# Patient Record
Sex: FEMALE | Race: WHITE
[De-identification: names, ages, dates, MRNs, and addresses within clinical notes are randomized per-mention and may not be internally consistent; named-entity substitution may affect disease eponyms.]

---

## 2017-09-02 ENCOUNTER — HOSPITAL ENCOUNTER (EMERGENCY)
Dept: HOSPITAL 23 - CFTX | Age: 32
Discharge: HOME | End: 2017-09-02
Payer: COMMERCIAL

## 2017-09-02 VITALS — WEIGHT: 293 LBS | HEIGHT: 61 IN | BODY MASS INDEX: 55.32 KG/M2

## 2017-09-02 DIAGNOSIS — Z88.1: ICD-10-CM

## 2017-09-02 DIAGNOSIS — F17.200: ICD-10-CM

## 2017-09-02 DIAGNOSIS — J06.9: ICD-10-CM

## 2017-09-02 DIAGNOSIS — J01.90: Primary | ICD-10-CM

## 2017-09-02 DIAGNOSIS — Z91.040: ICD-10-CM

## 2020-02-21 ENCOUNTER — HOSPITAL ENCOUNTER (EMERGENCY)
Facility: HOSPITAL | Age: 35
Discharge: HOME | End: 2020-02-21
Attending: EMERGENCY MEDICINE
Payer: MEDICAID

## 2020-02-21 VITALS
BODY MASS INDEX: 55.32 KG/M2 | DIASTOLIC BLOOD PRESSURE: 66 MMHG | SYSTOLIC BLOOD PRESSURE: 131 MMHG | RESPIRATION RATE: 18 BRPM | HEIGHT: 61 IN | WEIGHT: 293 LBS | OXYGEN SATURATION: 94 % | TEMPERATURE: 99 F | HEART RATE: 70 BPM

## 2020-02-21 DIAGNOSIS — G89.29 CHRONIC PELVIC PAIN IN FEMALE: Primary | ICD-10-CM

## 2020-02-21 DIAGNOSIS — R10.2 CHRONIC PELVIC PAIN IN FEMALE: Primary | ICD-10-CM

## 2020-02-21 LAB
B-HCG UR QL: NEGATIVE
BILIRUB UR QL STRIP: NEGATIVE
CLARITY UR REFRACT.AUTO: ABNORMAL
COLOR UR AUTO: YELLOW
CTP QC/QA: YES
GLUCOSE UR QL STRIP: NEGATIVE
HGB UR QL STRIP: NEGATIVE
KETONES UR QL STRIP: NEGATIVE
LEUKOCYTE ESTERASE UR QL STRIP: NEGATIVE
NITRITE UR QL STRIP: NEGATIVE
PH UR STRIP: 6 [PH] (ref 5–8)
PROT UR QL STRIP: NEGATIVE
SP GR UR STRIP: 1.02 (ref 1–1.03)
URN SPEC COLLECT METH UR: ABNORMAL

## 2020-02-21 PROCEDURE — 99284 EMERGENCY DEPT VISIT MOD MDM: CPT | Mod: ,,, | Performed by: EMERGENCY MEDICINE

## 2020-02-21 PROCEDURE — 25000003 PHARM REV CODE 250: Performed by: EMERGENCY MEDICINE

## 2020-02-21 PROCEDURE — 99283 EMERGENCY DEPT VISIT LOW MDM: CPT | Mod: 25

## 2020-02-21 PROCEDURE — 81003 URINALYSIS AUTO W/O SCOPE: CPT

## 2020-02-21 PROCEDURE — 99284 PR EMERGENCY DEPT VISIT,LEVEL IV: ICD-10-PCS | Mod: ,,, | Performed by: EMERGENCY MEDICINE

## 2020-02-21 PROCEDURE — 81025 URINE PREGNANCY TEST: CPT | Performed by: EMERGENCY MEDICINE

## 2020-02-21 RX ORDER — DICYCLOMINE HYDROCHLORIDE 10 MG/1
20 CAPSULE ORAL
Status: COMPLETED | OUTPATIENT
Start: 2020-02-21 | End: 2020-02-21

## 2020-02-21 RX ORDER — DICYCLOMINE HYDROCHLORIDE 20 MG/1
20 TABLET ORAL 2 TIMES DAILY PRN
Qty: 25 TABLET | Refills: 0 | Status: SHIPPED | OUTPATIENT
Start: 2020-02-21 | End: 2020-03-22

## 2020-02-21 RX ADMIN — DICYCLOMINE HYDROCHLORIDE 20 MG: 10 CAPSULE ORAL at 11:02

## 2020-02-21 NOTE — ED NOTES
Pt reports pain made worse by lifting, suspects that intercourse last night might have provoked it

## 2020-02-21 NOTE — ED TRIAGE NOTES
Pt reports intense pain deep in groin area for three and a half years, made worse by bowel movements

## 2020-02-21 NOTE — ED NOTES
"Pt report "I guaruntee you I have a UTI right now -- it hurts to pee and it stinks. I finished a round of macrobid but it didn't do anything"  "

## 2020-02-21 NOTE — ED PROVIDER NOTES
Encounter Date: 2/21/2020    SCRIBE #1 NOTE: I, Jill Saavedra, am scribing for, and in the presence of,  Dr. Elliott. I have scribed the following portions of the note - Other sections scribed: HPI ROS PE.       History     Chief Complaint   Patient presents with    Abdominal Pain     onset abd pain that began  this am - deneis n/v. Had  diarrhea 2 days ago and is now constipated     Time patient was seen by the provider: 11:26 AM      The patient is a 34 y.o. female with PMH including: DDD, fibromyalgia, asthma, morbid obesity who presents to the ED with a complaint of reoccurring abdominal pain onset this morning. The patient reports she has been having abdominal pain for 3 years now. She was seen by her OBGYN and PCP for this. She is being seen by a NP here. Patient states pain is mostly on the left lower quadrant of her abdomen. The pain presents when having a bowel movement this morning. Pain waxes and wanes in intensity, lasting for a few minutes of severe pain and then subsides on its own. Rates pain 3/10 at this time. 10/10 when in severe pain. She states she had diarrhea 2 days ago. Today she is constipated. Took 2 pills of Aleve today, no improvement of pain. She had sexual intercourse last night. She also notes she just finished a course of Macrobid for UTI, still complaining of having urinary symptoms. She had a colonoscopy done 4 years ago which was normal. She also reports having had multiple CT, US to evaluate her pain but the cause for the pain could not be found.    The history is provided by the patient and medical records.     Review of patient's allergies indicates:   Allergen Reactions    Adhesive     Doxycycline     Gabapentin     Latex, natural rubber     Morphine     Zithromax [azithromycin]      Past Medical History:   Diagnosis Date    Asthma     DDD (degenerative disc disease), lumbar     Disorder of kidney and ureter     Fibromyalgia      Past Surgical History:   Procedure  Laterality Date    HAND TENDON SURGERY      TUBAL LIGATION       Family History   Problem Relation Age of Onset    Breast cancer Paternal Aunt      Social History     Tobacco Use    Smoking status: Current Every Day Smoker     Packs/day: 1.00    Smokeless tobacco: Current User   Substance Use Topics    Alcohol use: Yes     Comment: rare    Drug use: Not on file     Review of Systems   Constitutional: Negative for fever.   HENT: Negative for sore throat.    Eyes: Negative for visual disturbance.   Respiratory: Negative for shortness of breath.    Cardiovascular: Negative for chest pain.   Gastrointestinal: Positive for abdominal pain and constipation. Negative for nausea and vomiting.   Endocrine: Negative for polydipsia and polyuria.   Genitourinary: Positive for dysuria. Negative for difficulty urinating and flank pain.   Musculoskeletal: Negative for back pain.   Skin: Negative for rash.   Allergic/Immunologic: Negative for immunocompromised state.   Neurological: Negative for weakness.   Hematological: Does not bruise/bleed easily.   Psychiatric/Behavioral: The patient is not nervous/anxious.        Physical Exam     Initial Vitals [02/21/20 1107]   BP Pulse Resp Temp SpO2   133/83 68 18 98.5 °F (36.9 °C) (!) 94 %      MAP       --         Physical Exam    Nursing note and vitals reviewed.  Constitutional: She appears well-developed and well-nourished. She is not diaphoretic. She is Obese . No distress.   HENT:   Head: Normocephalic and atraumatic.   Right Ear: External ear normal.   Left Ear: External ear normal.   Nose: Nose normal.   Eyes: Conjunctivae and EOM are normal. Pupils are equal, round, and reactive to light.   Neck: Neck supple.   Cardiovascular: Normal rate and regular rhythm.   Pulmonary/Chest: Breath sounds normal. No respiratory distress.   Abdominal: Soft. Bowel sounds are normal. She exhibits no distension. There is tenderness (left lower quadrant). There is no rebound and no guarding.    Obese abdomen   Musculoskeletal: Normal range of motion.   Neurological: She is alert and oriented to person, place, and time.   Skin: Skin is warm and dry.   Psychiatric: She has a normal mood and affect. Her behavior is normal. Judgment and thought content normal.         ED Course   Procedures  Labs Reviewed   URINALYSIS, REFLEX TO URINE CULTURE - Abnormal; Notable for the following components:       Result Value    Appearance, UA Hazy (*)     All other components within normal limits    Narrative:     Preferred Collection Type->Urine, Clean Catch   POCT URINE PREGNANCY          Imaging Results    None          Medical Decision Making:   History:   Old Medical Records: I decided to obtain old medical records.  Initial Assessment:   Tenderness to panus of left lower quadrant  Has been ongoing for 3 years  Has diarrhea and constipation off and on  No new characteristics to chronic pain  Differential Diagnosis:   IBS, IBD, ovarian torsion, PID, TOA, colitis, diverticulitis  Clinical Tests:   Lab Tests: Ordered and Reviewed  ED Management:  Given this pain has been going on for 3 years with multiple extensive work ups I dont think additional imaging today will yield a cause for her pain  Sounds like she may have IBS, will trial bentyl  She is nontoxic appearing, hemodynamically stable    Discharged to home in stable condition, return to ED warnings given, follow up and patient care instructions given.              Scribe Attestation:   Scribe #1: I performed the above scribed service and the documentation accurately describes the services I performed. I attest to the accuracy of the note.                          Clinical Impression:       ICD-10-CM ICD-9-CM   1. Chronic pelvic pain in female R10.2 625.9    G89.29 338.29         Disposition:   Disposition: Discharged  Condition: Stable                     Dominga Elliott MD  02/23/20 3444

## 2020-02-21 NOTE — DISCHARGE INSTRUCTIONS
I recommend close follow up with your ob-gyn and primary doctor for your pain. It sounds like you may have irritable bowel syndrome given your off and on constipation and diarrhea. I have prescribed you Bentyl for pain.      You have no signs of a urine infection today.    Seek immediate medical attention if you have fever, persistent pain, new or worsening symptoms, or for any other concern.

## 2021-11-01 ENCOUNTER — OFFICE VISIT (OUTPATIENT)
Dept: FAMILY MEDICINE CLINIC | Facility: CLINIC | Age: 36
End: 2021-11-01

## 2021-11-01 VITALS
OXYGEN SATURATION: 99 % | DIASTOLIC BLOOD PRESSURE: 80 MMHG | BODY MASS INDEX: 53.92 KG/M2 | WEIGHT: 293 LBS | TEMPERATURE: 97.3 F | HEART RATE: 92 BPM | HEIGHT: 62 IN | SYSTOLIC BLOOD PRESSURE: 138 MMHG

## 2021-11-01 DIAGNOSIS — R12 HEARTBURN: ICD-10-CM

## 2021-11-01 DIAGNOSIS — M79.7 FIBROMYALGIA: ICD-10-CM

## 2021-11-01 DIAGNOSIS — Z79.4 TYPE 2 DIABETES MELLITUS WITH HYPOGLYCEMIA WITHOUT COMA, WITH LONG-TERM CURRENT USE OF INSULIN (HCC): Primary | ICD-10-CM

## 2021-11-01 DIAGNOSIS — E11.43 TYPE 2 DIABETES MELLITUS WITH DIABETIC AUTONOMIC NEUROPATHY, WITHOUT LONG-TERM CURRENT USE OF INSULIN (HCC): ICD-10-CM

## 2021-11-01 DIAGNOSIS — E66.01 MORBID OBESITY WITH BMI OF 70 AND OVER, ADULT (HCC): ICD-10-CM

## 2021-11-01 DIAGNOSIS — E78.00 PURE HYPERCHOLESTEROLEMIA: ICD-10-CM

## 2021-11-01 DIAGNOSIS — J45.909 PERSISTENT ASTHMA WITHOUT COMPLICATION, UNSPECIFIED ASTHMA SEVERITY: ICD-10-CM

## 2021-11-01 DIAGNOSIS — F31.9 BIPOLAR DISEASE, CHRONIC (HCC): ICD-10-CM

## 2021-11-01 DIAGNOSIS — F41.1 GAD (GENERALIZED ANXIETY DISORDER): ICD-10-CM

## 2021-11-01 DIAGNOSIS — I51.7 CARDIOMEGALY: ICD-10-CM

## 2021-11-01 DIAGNOSIS — E11.649 TYPE 2 DIABETES MELLITUS WITH HYPOGLYCEMIA WITHOUT COMA, WITH LONG-TERM CURRENT USE OF INSULIN (HCC): Primary | ICD-10-CM

## 2021-11-01 DIAGNOSIS — Z72.0 TOBACCO ABUSE: ICD-10-CM

## 2021-11-01 DIAGNOSIS — M51.36 DDD (DEGENERATIVE DISC DISEASE), LUMBAR: ICD-10-CM

## 2021-11-01 PROCEDURE — 99204 OFFICE O/P NEW MOD 45 MIN: CPT | Performed by: NURSE PRACTITIONER

## 2021-11-01 RX ORDER — ALPRAZOLAM 0.5 MG/1
0.5 TABLET ORAL
COMMUNITY
End: 2021-11-15

## 2021-11-01 RX ORDER — OMEPRAZOLE 40 MG/1
40 CAPSULE, DELAYED RELEASE ORAL
COMMUNITY
Start: 2021-08-19 | End: 2021-11-02 | Stop reason: SDUPTHER

## 2021-11-01 RX ORDER — LEVOTHYROXINE SODIUM 0.05 MG/1
50 TABLET ORAL DAILY
COMMUNITY
End: 2021-11-02 | Stop reason: SDUPTHER

## 2021-11-01 RX ORDER — ATORVASTATIN CALCIUM 40 MG/1
40 TABLET, FILM COATED ORAL DAILY
COMMUNITY
End: 2021-11-02 | Stop reason: SDUPTHER

## 2021-11-01 RX ORDER — CLONIDINE HYDROCHLORIDE 0.1 MG/1
0.1 TABLET ORAL
COMMUNITY
Start: 2021-05-17 | End: 2021-11-02 | Stop reason: SDUPTHER

## 2021-11-01 RX ORDER — CETIRIZINE HYDROCHLORIDE 10 MG/1
10 TABLET ORAL DAILY
COMMUNITY
End: 2021-11-02 | Stop reason: SDUPTHER

## 2021-11-01 RX ORDER — FLUTICASONE PROPIONATE 50 MCG
1 SPRAY, SUSPENSION (ML) NASAL 2 TIMES DAILY
COMMUNITY
Start: 2021-05-17 | End: 2021-11-02 | Stop reason: SDUPTHER

## 2021-11-01 RX ORDER — DICYCLOMINE HYDROCHLORIDE 10 MG/1
10 CAPSULE ORAL 4 TIMES DAILY
COMMUNITY
Start: 2021-04-22 | End: 2021-11-02 | Stop reason: SDUPTHER

## 2021-11-01 RX ORDER — ALBUTEROL SULFATE 90 UG/1
2 AEROSOL, METERED RESPIRATORY (INHALATION)
COMMUNITY
End: 2021-11-02 | Stop reason: SDUPTHER

## 2021-11-01 RX ORDER — NITROFURANTOIN MACROCRYSTALS 100 MG/1
CAPSULE ORAL
COMMUNITY
Start: 2021-07-19 | End: 2021-11-15

## 2021-11-01 RX ORDER — PHENAZOPYRIDINE HYDROCHLORIDE 100 MG/1
TABLET, FILM COATED ORAL
COMMUNITY
Start: 2021-06-03

## 2021-11-01 RX ORDER — TIZANIDINE HYDROCHLORIDE 4 MG/1
4 CAPSULE, GELATIN COATED ORAL 3 TIMES DAILY
COMMUNITY
End: 2021-11-02

## 2021-11-01 RX ORDER — LOSARTAN POTASSIUM 100 MG/1
100 TABLET ORAL DAILY
COMMUNITY
End: 2021-11-02 | Stop reason: SDUPTHER

## 2021-11-01 RX ORDER — ONDANSETRON 4 MG/1
TABLET, FILM COATED ORAL
COMMUNITY
Start: 2021-08-21 | End: 2021-11-02 | Stop reason: SDUPTHER

## 2021-11-01 RX ORDER — SUCRALFATE ORAL 1 G/10ML
1 SUSPENSION ORAL 4 TIMES DAILY
COMMUNITY
End: 2021-11-02 | Stop reason: SDUPTHER

## 2021-11-01 RX ORDER — HYDROXYCHLOROQUINE SULFATE 200 MG/1
TABLET, FILM COATED ORAL DAILY
COMMUNITY
Start: 2021-06-21 | End: 2021-11-15

## 2021-11-01 RX ORDER — BUDESONIDE AND FORMOTEROL FUMARATE DIHYDRATE 160; 4.5 UG/1; UG/1
2 AEROSOL RESPIRATORY (INHALATION)
COMMUNITY
End: 2021-11-02 | Stop reason: SDUPTHER

## 2021-11-01 RX ORDER — TRAMADOL HYDROCHLORIDE 100 MG/1
1 TABLET, COATED ORAL 4 TIMES DAILY
COMMUNITY

## 2021-11-01 RX ORDER — FERROUS SULFATE 325(65) MG
325 TABLET ORAL DAILY
COMMUNITY
End: 2021-11-02 | Stop reason: SDUPTHER

## 2021-11-01 RX ORDER — GABAPENTIN 800 MG/1
800 TABLET ORAL 3 TIMES DAILY
COMMUNITY

## 2021-11-01 NOTE — PROGRESS NOTES
"Chief Complaint  Establish Care and Med Refill    Subjective          Sandra Way presents to [facility]  History of Present Illness     Patient is here today to establish care as a new patient.  She was previous with a provider in [place].  SVEN Thompson, [facility].  She just moved back from [place]    She states she recently had COVID and it brought her WBC count down to normal.    She states her wbc, rbc and platelets were elevated.  She was referred to hematology and states that they wouldn't do \"anything\"    She states she has been out of everything for about 2.5 weeks.  States that she has fibromyalgia and pain has been terrible.   She states that she was declined by rheumatology    She states she was scheduled to see a  in march 2022.   States she was over 100 lbs by the time she was 6.   States that she has had this for 14 years    States that she was seen by endocrinology  Wasn't seen by neurology and was denied by them.    LFT-comes back normal, but she keeps having pain and they diagnosed her with fibrosis of the liver- Saw GI   Had biopsy.      Requesting pain management referral-when asked about   Left hip pain. Would like injections    Lives with UTI-states she stays on antibiotics    Anxiety-used to see Dr. Goncalves for psychiatry.   Was on topamax, effexor, and clonazepam     States that she does smoke marijuana    Objective   Vital Signs:   /80   Pulse 92   Temp 97.3 °F (36.3 °C)   Ht 156.8 cm (61.75\")   Wt (!) 205 kg (453 lb)   SpO2 99%   BMI 83.53 kg/m²     Physical Exam  Constitutional:       Appearance: Normal appearance.   Cardiovascular:      Rate and Rhythm: Normal rate and regular rhythm.   Pulmonary:      Effort: Pulmonary effort is normal.      Breath sounds: Normal breath sounds.   Neurological:      Mental Status: She is alert and oriented to person, place, and time.   Psychiatric:         Mood and Affect: Mood is " anxious. Affect is tearful.         Speech: Speech is rapid and pressured.         Behavior: Behavior normal.         Thought Content: Thought content normal.         Cognition and Memory: Cognition and memory normal.         Judgment: Judgment normal.        Result Review :                 Assessment and Plan    Diagnoses and all orders for this visit:    1. Type 2 diabetes mellitus with hypoglycemia without coma, with long-term current use of insulin ([facility]) (Primary)  -     Urine Drug Screen - Urine, Clean Catch; Future  -     Urine Drug Screen - Urine, Clean Catch    2. Persistent asthma without complication, unspecified asthma severity    3. Bipolar disease, chronic ([facility])    4. Cardiomegaly    5. DDD (degenerative disc disease), lumbar    6. Fibromyalgia    7. MASOOD (generalized anxiety disorder)    8. Heartburn    9. Morbid obesity with BMI of 70 and over, adult ([facility])    10. Tobacco abuse    11. Type 2 diabetes mellitus with diabetic autonomic neuropathy, without long-term current use of insulin ([facility])    12. Pure hypercholesterolemia      Discussed with patient I will give refills on all medications except controlled substances pending review of records and MARVIN.  Will go ahead and obtain UDS today as well.      Would like her to follow back up in 2 weeks for evaluation and further discussion as well as fasting labs.  Hopefully will have records by then.    I discussed the importance of tobacco cessation.  She is not willing to take on a cessation plan.    I discussed the importance decrease of calories and carbs in diet and inclusion of exercise.    At follow-up we will discuss possible referrals and other treatments.      Time spent on visit was 52 minutes  Did not fill   Gabapentin,   Tramadol  Xanax  Plaquenil       Follow Up   Return in about 2 weeks (around 11/15/2021), or if symptoms worsen or fail to improve, for two spots for visit please.  Patient was given instructions and counseling regarding her  condition or for health maintenance advice. Please see specific information pulled into the AVS if appropriate.

## 2021-11-02 PROBLEM — R60.0 LOWER EXTREMITY EDEMA: Status: ACTIVE | Noted: 2021-03-10

## 2021-11-02 PROBLEM — K58.0 IRRITABLE BOWEL SYNDROME WITH DIARRHEA: Status: ACTIVE | Noted: 2021-03-29

## 2021-11-02 PROBLEM — E66.01 MORBID OBESITY WITH BMI OF 70 AND OVER, ADULT (HCC): Status: ACTIVE | Noted: 2021-10-07

## 2021-11-02 PROBLEM — N20.0 KIDNEY STONE: Status: ACTIVE | Noted: 2019-04-24

## 2021-11-02 PROBLEM — R76.11 PPD POSITIVE: Status: ACTIVE | Noted: 2019-04-24

## 2021-11-02 PROBLEM — E78.00 PURE HYPERCHOLESTEROLEMIA: Status: ACTIVE | Noted: 2021-03-10

## 2021-11-02 PROBLEM — K76.0 NAFLD (NONALCOHOLIC FATTY LIVER DISEASE): Status: ACTIVE | Noted: 2021-03-29

## 2021-11-02 PROBLEM — M79.7 FIBROMYOSITIS: Status: ACTIVE | Noted: 2021-06-17

## 2021-11-02 PROBLEM — I51.7 CARDIOMEGALY: Status: ACTIVE | Noted: 2021-03-10

## 2021-11-02 PROBLEM — G47.33 OSA ON CPAP: Status: ACTIVE | Noted: 2021-10-07

## 2021-11-02 PROBLEM — J12.82 PNEUMONIA DUE TO COVID-19 VIRUS: Status: ACTIVE | Noted: 2021-10-07

## 2021-11-02 PROBLEM — F41.0 PANIC DISORDER: Status: ACTIVE | Noted: 2019-04-24

## 2021-11-02 PROBLEM — E11.9 TYPE 2 DIABETES MELLITUS, WITHOUT LONG-TERM CURRENT USE OF INSULIN (HCC): Status: ACTIVE | Noted: 2021-03-10

## 2021-11-02 PROBLEM — J45.909 ASTHMA: Status: ACTIVE | Noted: 2019-04-24

## 2021-11-02 PROBLEM — U07.1 PNEUMONIA DUE TO COVID-19 VIRUS: Status: ACTIVE | Noted: 2021-10-07

## 2021-11-02 PROBLEM — J30.2 SEASONAL ALLERGIES: Status: ACTIVE | Noted: 2019-04-24

## 2021-11-02 PROBLEM — E04.2 MULTIPLE THYROID NODULES: Status: ACTIVE | Noted: 2021-06-09

## 2021-11-02 PROBLEM — M79.7 FIBROMYALGIA: Status: ACTIVE | Noted: 2019-04-24

## 2021-11-02 PROBLEM — F41.1 GAD (GENERALIZED ANXIETY DISORDER): Status: ACTIVE | Noted: 2019-04-24

## 2021-11-02 PROBLEM — K29.70 GASTRITIS: Status: ACTIVE | Noted: 2021-03-29

## 2021-11-02 PROBLEM — Z82.49 FAMILY HISTORY OF HEART DISEASE: Status: ACTIVE | Noted: 2021-03-10

## 2021-11-02 PROBLEM — Z99.89 OSA ON CPAP: Status: ACTIVE | Noted: 2021-10-07

## 2021-11-02 RX ORDER — ALBUTEROL SULFATE 90 UG/1
2 AEROSOL, METERED RESPIRATORY (INHALATION) EVERY 4 HOURS PRN
Qty: 18 G | Refills: 0 | Status: SHIPPED | OUTPATIENT
Start: 2021-11-02

## 2021-11-02 RX ORDER — SUCRALFATE ORAL 1 G/10ML
1 SUSPENSION ORAL 4 TIMES DAILY
Qty: 1200 ML | Refills: 0 | Status: SHIPPED | OUTPATIENT
Start: 2021-11-02

## 2021-11-02 RX ORDER — FERROUS SULFATE 325(65) MG
325 TABLET ORAL DAILY
Qty: 30 TABLET | Refills: 0 | Status: SHIPPED | OUTPATIENT
Start: 2021-11-02

## 2021-11-02 RX ORDER — CETIRIZINE HYDROCHLORIDE 10 MG/1
10 TABLET ORAL DAILY
Qty: 30 TABLET | Refills: 0 | Status: SHIPPED | OUTPATIENT
Start: 2021-11-02 | End: 2021-12-27

## 2021-11-02 RX ORDER — CLONIDINE HYDROCHLORIDE 0.1 MG/1
0.1 TABLET ORAL 2 TIMES DAILY
Qty: 60 TABLET | Refills: 0 | Status: SHIPPED | OUTPATIENT
Start: 2021-11-02

## 2021-11-02 RX ORDER — LEVOTHYROXINE SODIUM 0.05 MG/1
50 TABLET ORAL DAILY
Qty: 30 TABLET | Refills: 0 | Status: SHIPPED | OUTPATIENT
Start: 2021-11-02

## 2021-11-02 RX ORDER — LOSARTAN POTASSIUM 100 MG/1
100 TABLET ORAL DAILY
Qty: 30 TABLET | Refills: 0 | Status: SHIPPED | OUTPATIENT
Start: 2021-11-02

## 2021-11-02 RX ORDER — TIZANIDINE 4 MG/1
4 TABLET ORAL NIGHTLY PRN
Qty: 30 TABLET | Refills: 0 | Status: SHIPPED | OUTPATIENT
Start: 2021-11-02

## 2021-11-02 RX ORDER — OMEPRAZOLE 40 MG/1
40 CAPSULE, DELAYED RELEASE ORAL DAILY
Qty: 30 CAPSULE | Refills: 0 | Status: SHIPPED | OUTPATIENT
Start: 2021-11-02 | End: 2022-11-03

## 2021-11-02 RX ORDER — FLUTICASONE PROPIONATE 50 MCG
1 SPRAY, SUSPENSION (ML) NASAL DAILY
Qty: 16 G | Refills: 0 | Status: SHIPPED | OUTPATIENT
Start: 2021-11-02 | End: 2021-12-06

## 2021-11-02 RX ORDER — DICYCLOMINE HYDROCHLORIDE 10 MG/1
10 CAPSULE ORAL 4 TIMES DAILY
Qty: 120 CAPSULE | Refills: 0 | Status: SHIPPED | OUTPATIENT
Start: 2021-11-02

## 2021-11-02 RX ORDER — ONDANSETRON 4 MG/1
4 TABLET, FILM COATED ORAL EVERY 8 HOURS PRN
Qty: 30 TABLET | Refills: 0 | Status: SHIPPED | OUTPATIENT
Start: 2021-11-02

## 2021-11-02 RX ORDER — ATORVASTATIN CALCIUM 40 MG/1
40 TABLET, FILM COATED ORAL DAILY
Qty: 30 TABLET | Refills: 0 | Status: SHIPPED | OUTPATIENT
Start: 2021-11-02

## 2021-11-02 RX ORDER — BUDESONIDE AND FORMOTEROL FUMARATE DIHYDRATE 160; 4.5 UG/1; UG/1
2 AEROSOL RESPIRATORY (INHALATION)
Qty: 10.2 G | Refills: 0 | Status: SHIPPED | OUTPATIENT
Start: 2021-11-02

## 2021-11-04 ENCOUNTER — TELEPHONE (OUTPATIENT)
Dept: FAMILY MEDICINE CLINIC | Facility: CLINIC | Age: 36
End: 2021-11-04

## 2021-11-04 LAB
AMPHETAMINES UR QL SCN: NEGATIVE NG/ML
BARBITURATES UR QL SCN: NEGATIVE NG/ML
BENZODIAZ UR QL SCN: NEGATIVE NG/ML
BZE UR QL SCN: NEGATIVE NG/ML
CANNABINOIDS UR QL SCN: POSITIVE NG/ML
CREAT UR-MCNC: 176.6 MG/DL (ref 20–300)
LABORATORY COMMENT REPORT: ABNORMAL
METHADONE UR QL SCN: NEGATIVE NG/ML
OPIATES UR QL SCN: NEGATIVE NG/ML
OXYCODONE+OXYMORPHONE UR QL SCN: POSITIVE NG/ML
PCP UR QL: NEGATIVE NG/ML
PH UR: 6.2 [PH] (ref 4.5–8.9)
PROPOXYPH UR QL SCN: NEGATIVE NG/ML

## 2021-11-04 NOTE — TELEPHONE ENCOUNTER
----- Message from Gini Manzanares MA sent at 11/4/2021  2:08 PM EDT -----  Spoke with pt with test results.  Pt stated that sera belittled her and told her she was a liar when asked about her daily food intake.  Pt was screaming and cursing at me over the phone pt stated that sera was not one bit compassionate and was very degrading to her. She wanted know what she was suppose to do now because every doctor she has ever went to did not believe her.  She said she was never coming back and wanted her chart closed.

## 2021-11-04 NOTE — TELEPHONE ENCOUNTER
I most certainly did not belittle her.  We did discuss her dietary intake and I did say that she had to be taking in more calories than she was burning to continue to gain and maintain her current weight.  This has nothing to do with that though. It is the inconsistency of her drug screen to reported medications and lack there of.  She also will not be welcome back at this practice because she was screaming and cursing at my staff.  That is not tolerated here. Please close this patients chart and take me off as her provider per her request.

## 2021-11-04 NOTE — PROGRESS NOTES
Please call the patient regarding her drug screen.  Patient had a positive screen for THC which she told me about.  However, she also had a positive screen for oxycodone or oxymorphone.  Based on this finding I will not be able to prescribe her any of her controlled substances. If she would like me to continue care without controlled substances I can.  However, I will not prescribe her controlled substances due to this inconsistency.

## 2021-12-06 RX ORDER — FLUTICASONE PROPIONATE 50 MCG
SPRAY, SUSPENSION (ML) NASAL
Qty: 16 G | Refills: 0 | Status: SHIPPED | OUTPATIENT
Start: 2021-12-06

## 2021-12-27 RX ORDER — CETIRIZINE HYDROCHLORIDE 10 MG/1
TABLET ORAL
Qty: 30 TABLET | Refills: 0 | Status: SHIPPED | OUTPATIENT
Start: 2021-12-27

## 2022-06-08 ENCOUNTER — TRANSCRIBE ORDERS (OUTPATIENT)
Dept: PHYSICAL THERAPY | Facility: CLINIC | Age: 37
End: 2022-06-08

## 2022-06-08 DIAGNOSIS — M79.7 FIBROMYALGIA: Primary | ICD-10-CM

## 2022-06-08 DIAGNOSIS — R53.82 CHRONIC FATIGUE: ICD-10-CM

## 2024-01-11 ENCOUNTER — HOSPITAL ENCOUNTER (EMERGENCY)
Facility: HOSPITAL | Age: 39
Discharge: HOME | End: 2024-01-11
Attending: EMERGENCY MEDICINE
Payer: MEDICARE

## 2024-01-11 VITALS
SYSTOLIC BLOOD PRESSURE: 150 MMHG | HEART RATE: 79 BPM | WEIGHT: 293 LBS | DIASTOLIC BLOOD PRESSURE: 98 MMHG | RESPIRATION RATE: 16 BRPM | BODY MASS INDEX: 55.32 KG/M2 | TEMPERATURE: 98.1 F | OXYGEN SATURATION: 94 % | HEIGHT: 61 IN

## 2024-01-11 DIAGNOSIS — N23 RENAL COLIC ON LEFT SIDE: Primary | ICD-10-CM

## 2024-01-11 DIAGNOSIS — T83.84XA PAIN DUE TO URETERAL STENT, INITIAL ENCOUNTER: ICD-10-CM

## 2024-01-11 LAB
ALBUMIN SERPL-MCNC: 3.7 G/DL (ref 3.5–5.2)
ALBUMIN/GLOB SERPL: 1.3 G/DL
ALP SERPL-CCNC: 80 U/L (ref 39–117)
ALT SERPL W P-5'-P-CCNC: 15 U/L (ref 1–33)
ANION GAP SERPL CALCULATED.3IONS-SCNC: 11.6 MMOL/L (ref 5–15)
AST SERPL-CCNC: 11 U/L (ref 1–32)
BACTERIA UR QL AUTO: ABNORMAL /HPF
BASOPHILS # BLD AUTO: 0.07 10*3/MM3 (ref 0–0.2)
BASOPHILS NFR BLD AUTO: 0.4 % (ref 0–1.5)
BILIRUB SERPL-MCNC: 0.3 MG/DL (ref 0–1.2)
BILIRUB UR QL STRIP: NEGATIVE
BUN SERPL-MCNC: 16 MG/DL (ref 6–20)
BUN/CREAT SERPL: 29.1 (ref 7–25)
CALCIUM SPEC-SCNC: 8.5 MG/DL (ref 8.6–10.5)
CHLORIDE SERPL-SCNC: 101 MMOL/L (ref 98–107)
CLARITY UR: ABNORMAL
CO2 SERPL-SCNC: 25.4 MMOL/L (ref 22–29)
COLOR UR: YELLOW
CREAT SERPL-MCNC: 0.55 MG/DL (ref 0.57–1)
DEPRECATED RDW RBC AUTO: 41 FL (ref 37–54)
EGFRCR SERPLBLD CKD-EPI 2021: 122 ML/MIN/1.73
EOSINOPHIL # BLD AUTO: 0.7 10*3/MM3 (ref 0–0.4)
EOSINOPHIL NFR BLD AUTO: 3.9 % (ref 0.3–6.2)
ERYTHROCYTE [DISTWIDTH] IN BLOOD BY AUTOMATED COUNT: 13.9 % (ref 12.3–15.4)
GLOBULIN UR ELPH-MCNC: 2.9 GM/DL
GLUCOSE SERPL-MCNC: 160 MG/DL (ref 65–99)
GLUCOSE UR STRIP-MCNC: NEGATIVE MG/DL
HCG SERPL QL: NEGATIVE
HCT VFR BLD AUTO: 41.6 % (ref 34–46.6)
HGB BLD-MCNC: 13.8 G/DL (ref 12–15.9)
HGB UR QL STRIP.AUTO: ABNORMAL
HYALINE CASTS UR QL AUTO: ABNORMAL /LPF
IMM GRANULOCYTES # BLD AUTO: 0.16 10*3/MM3 (ref 0–0.05)
IMM GRANULOCYTES NFR BLD AUTO: 0.9 % (ref 0–0.5)
KETONES UR QL STRIP: NEGATIVE
LEUKOCYTE ESTERASE UR QL STRIP.AUTO: ABNORMAL
LIPASE SERPL-CCNC: 8 U/L (ref 13–60)
LYMPHOCYTES # BLD AUTO: 3.92 10*3/MM3 (ref 0.7–3.1)
LYMPHOCYTES NFR BLD AUTO: 22 % (ref 19.6–45.3)
MCH RBC QN AUTO: 27.3 PG (ref 26.6–33)
MCHC RBC AUTO-ENTMCNC: 33.2 G/DL (ref 31.5–35.7)
MCV RBC AUTO: 82.2 FL (ref 79–97)
MONOCYTES # BLD AUTO: 0.85 10*3/MM3 (ref 0.1–0.9)
MONOCYTES NFR BLD AUTO: 4.8 % (ref 5–12)
NEUTROPHILS NFR BLD AUTO: 12.13 10*3/MM3 (ref 1.7–7)
NEUTROPHILS NFR BLD AUTO: 68 % (ref 42.7–76)
NITRITE UR QL STRIP: NEGATIVE
NRBC BLD AUTO-RTO: 0 /100 WBC (ref 0–0.2)
PH UR STRIP.AUTO: 5.5 [PH] (ref 5–8)
PLATELET # BLD AUTO: 416 10*3/MM3 (ref 140–450)
PMV BLD AUTO: 10.4 FL (ref 6–12)
POTASSIUM SERPL-SCNC: 4.1 MMOL/L (ref 3.5–5.2)
PROT SERPL-MCNC: 6.6 G/DL (ref 6–8.5)
PROT UR QL STRIP: ABNORMAL
RBC # BLD AUTO: 5.06 10*6/MM3 (ref 3.77–5.28)
RBC # UR STRIP: ABNORMAL /HPF
REF LAB TEST METHOD: ABNORMAL
SODIUM SERPL-SCNC: 138 MMOL/L (ref 136–145)
SP GR UR STRIP: 1.02 (ref 1–1.03)
SQUAMOUS #/AREA URNS HPF: ABNORMAL /HPF
UROBILINOGEN UR QL STRIP: ABNORMAL
WBC # UR STRIP: ABNORMAL /HPF
WBC NRBC COR # BLD AUTO: 17.83 10*3/MM3 (ref 3.4–10.8)

## 2024-01-11 PROCEDURE — 25010000002 HYDROMORPHONE PER 4 MG: Performed by: EMERGENCY MEDICINE

## 2024-01-11 PROCEDURE — 99283 EMERGENCY DEPT VISIT LOW MDM: CPT

## 2024-01-11 PROCEDURE — 80053 COMPREHEN METABOLIC PANEL: CPT | Performed by: EMERGENCY MEDICINE

## 2024-01-11 PROCEDURE — 84703 CHORIONIC GONADOTROPIN ASSAY: CPT | Performed by: EMERGENCY MEDICINE

## 2024-01-11 PROCEDURE — 81001 URINALYSIS AUTO W/SCOPE: CPT | Performed by: EMERGENCY MEDICINE

## 2024-01-11 PROCEDURE — 83690 ASSAY OF LIPASE: CPT | Performed by: EMERGENCY MEDICINE

## 2024-01-11 PROCEDURE — 96375 TX/PRO/DX INJ NEW DRUG ADDON: CPT

## 2024-01-11 PROCEDURE — 85025 COMPLETE CBC W/AUTO DIFF WBC: CPT | Performed by: EMERGENCY MEDICINE

## 2024-01-11 PROCEDURE — 25010000002 KETOROLAC TROMETHAMINE PER 15 MG: Performed by: PHYSICIAN ASSISTANT

## 2024-01-11 PROCEDURE — 96374 THER/PROPH/DIAG INJ IV PUSH: CPT

## 2024-01-11 PROCEDURE — 36415 COLL VENOUS BLD VENIPUNCTURE: CPT

## 2024-01-11 RX ORDER — PHENAZOPYRIDINE HYDROCHLORIDE 100 MG/1
200 TABLET, FILM COATED ORAL
Status: DISCONTINUED | OUTPATIENT
Start: 2024-01-11 | End: 2024-01-11 | Stop reason: HOSPADM

## 2024-01-11 RX ORDER — HYDROMORPHONE HYDROCHLORIDE 1 MG/ML
0.5 INJECTION, SOLUTION INTRAMUSCULAR; INTRAVENOUS; SUBCUTANEOUS ONCE
Status: COMPLETED | OUTPATIENT
Start: 2024-01-11 | End: 2024-01-11

## 2024-01-11 RX ORDER — KETOROLAC TROMETHAMINE 15 MG/ML
15 INJECTION, SOLUTION INTRAMUSCULAR; INTRAVENOUS ONCE
Status: COMPLETED | OUTPATIENT
Start: 2024-01-11 | End: 2024-01-11

## 2024-01-11 RX ORDER — OXYCODONE HYDROCHLORIDE AND ACETAMINOPHEN 5; 325 MG/1; MG/1
1 TABLET ORAL ONCE
Status: COMPLETED | OUTPATIENT
Start: 2024-01-11 | End: 2024-01-11

## 2024-01-11 RX ORDER — SULFAMETHOXAZOLE AND TRIMETHOPRIM 800; 160 MG/1; MG/1
1 TABLET ORAL EVERY 12 HOURS SCHEDULED
Status: DISCONTINUED | OUTPATIENT
Start: 2024-01-11 | End: 2024-01-11 | Stop reason: HOSPADM

## 2024-01-11 RX ORDER — OXYBUTYNIN CHLORIDE 5 MG/1
5 TABLET ORAL 3 TIMES DAILY
Status: DISCONTINUED | OUTPATIENT
Start: 2024-01-11 | End: 2024-01-11 | Stop reason: HOSPADM

## 2024-01-11 RX ORDER — SODIUM CHLORIDE 0.9 % (FLUSH) 0.9 %
10 SYRINGE (ML) INJECTION AS NEEDED
Status: DISCONTINUED | OUTPATIENT
Start: 2024-01-11 | End: 2024-01-11 | Stop reason: HOSPADM

## 2024-01-11 RX ADMIN — PHENAZOPYRIDINE 200 MG: 100 TABLET ORAL at 11:22

## 2024-01-11 RX ADMIN — SULFAMETHOXAZOLE AND TRIMETHOPRIM 1 TABLET: 800; 160 TABLET ORAL at 11:19

## 2024-01-11 RX ADMIN — OXYBUTYNIN CHLORIDE 5 MG: 5 TABLET ORAL at 11:19

## 2024-01-11 RX ADMIN — KETOROLAC TROMETHAMINE 15 MG: 15 INJECTION, SOLUTION INTRAMUSCULAR; INTRAVENOUS at 10:27

## 2024-01-11 RX ADMIN — HYDROMORPHONE HYDROCHLORIDE 0.5 MG: 1 INJECTION, SOLUTION INTRAMUSCULAR; INTRAVENOUS; SUBCUTANEOUS at 08:59

## 2024-01-11 RX ADMIN — OXYCODONE AND ACETAMINOPHEN 1 TABLET: 5; 325 TABLET ORAL at 05:41

## 2024-01-11 NOTE — ED NOTES
IV team nurse called back, states  trained IV nurse will be in around 0715 and will nurse know about need.

## 2024-01-11 NOTE — ED TRIAGE NOTES
"Pt brought to er by cousin assisted from vehicle to wheelchair; pt c/o left flank pain, low abdominal pain. Pt states she just left [facility] er while waiting to be admitted; pt states she had a 5 mm blockage that she had a stent placed on 01/08/23 and a 10 mm stone. I had blood in my urine yesterday and went to ER yesterday and have been there since.\" C/o increasing pain.     Pt Denies urinary rentention.   "

## 2024-01-11 NOTE — ED PROVIDER NOTES
MD ATTESTATION NOTE    SHARED VISIT: This visit was performed by BOTH a physician and an APC. The substantive portion of the medical decision making was performed by this attesting physician who made or approved the management plan and takes responsibility for patient management. All studies documented in the APC note (if performed) were independently interpreted by me.    The RONAK and I have discussed this patient's history, physical exam, MDM, and treatment plan.  I have reviewed the documentation and personally had a face to face interaction with the patient. The attached note describes my personal findings.      Sandra Way is a 38 y.o. female who presents to the ED c/o acute left flank pain.  Patient was recently diagnosed with a left ureteral stone and treated with stenting.  She was admitted to [facility] but left the hospital AGAINST MEDICAL ADVICE and wanted to come here for management.  She complains of persistent left flank pain that radiates to the left lower abdomen.  No fever.    On exam:  GENERAL: not distressed  HENT: nares patent  EYES: no scleral icterus  CV: regular rhythm, regular rate  RESPIRATORY: normal effort  ABDOMEN: soft, morbidly obese abdomen, tenderness to the left lower quadrant  MUSCULOSKELETAL: no deformity  NEURO: alert, moves all extremities, follows commands  SKIN: warm, dry    Labs  Recent Results (from the past 24 hour(s))   Lipase    Collection Time: 01/11/24  6:10 AM    Specimen: Blood   Result Value Ref Range    Lipase 8 (L) 13 - 60 U/L   hCG, Serum, Qualitative    Collection Time: 01/11/24  6:10 AM    Specimen: Blood   Result Value Ref Range    HCG Qualitative Negative Negative   CBC Auto Differential    Collection Time: 01/11/24  6:10 AM    Specimen: Blood   Result Value Ref Range    WBC 17.83 (H) 3.40 - 10.80 10*3/mm3    RBC 5.06 3.77 - 5.28 10*6/mm3    Hemoglobin 13.8 12.0 - 15.9 g/dL    Hematocrit 41.6 34.0 - 46.6 %    MCV 82.2 79.0 - 97.0 fL    MCH  27.3 26.6 - 33.0 pg    MCHC 33.2 31.5 - 35.7 g/dL    RDW 13.9 12.3 - 15.4 %    RDW-SD 41.0 37.0 - 54.0 fl    MPV 10.4 6.0 - 12.0 fL    Platelets 416 140 - 450 10*3/mm3    Neutrophil % 68.0 42.7 - 76.0 %    Lymphocyte % 22.0 19.6 - 45.3 %    Monocyte % 4.8 (L) 5.0 - 12.0 %    Eosinophil % 3.9 0.3 - 6.2 %    Basophil % 0.4 0.0 - 1.5 %    Immature Grans % 0.9 (H) 0.0 - 0.5 %    Neutrophils, Absolute 12.13 (H) 1.70 - 7.00 10*3/mm3    Lymphocytes, Absolute 3.92 (H) 0.70 - 3.10 10*3/mm3    Monocytes, Absolute 0.85 0.10 - 0.90 10*3/mm3    Eosinophils, Absolute 0.70 (H) 0.00 - 0.40 10*3/mm3    Basophils, Absolute 0.07 0.00 - 0.20 10*3/mm3    Immature Grans, Absolute 0.16 (H) 0.00 - 0.05 10*3/mm3    nRBC 0.0 0.0 - 0.2 /100 WBC   Urinalysis With Microscopic If Indicated (No Culture) - Urine, Clean Catch    Collection Time: 01/11/24  6:33 AM    Specimen: Urine, Clean Catch   Result Value Ref Range    Color, UA Yellow Yellow, Straw    Appearance, UA Cloudy (A) Clear    pH, UA 5.5 5.0 - 8.0    Specific Gravity, UA 1.022 1.005 - 1.030    Glucose, UA Negative Negative    Ketones, UA Negative Negative    Bilirubin, UA Negative Negative    Blood, UA Large (3+) (A) Negative    Protein,  mg/dL (2+) (A) Negative    Leuk Esterase, UA Small (1+) (A) Negative    Nitrite, UA Negative Negative    Urobilinogen, UA 0.2 E.U./dL 0.2 - 1.0 E.U./dL   Urinalysis, Microscopic Only - Urine, Clean Catch    Collection Time: 01/11/24  6:33 AM    Specimen: Urine, Clean Catch   Result Value Ref Range    RBC, UA 21-50 (A) None Seen, 0-2 /HPF    WBC, UA 0-2 None Seen, 0-2 /HPF    Bacteria, UA 1+ (A) None Seen /HPF    Squamous Epithelial Cells, UA 0-2 None Seen, 0-2 /HPF    Hyaline Casts, UA None Seen None Seen /LPF    Methodology Manual Light Microscopy        Radiology  CT Abdomen Pelvis Without Contrast    Result Date: 1/10/2024  ACCESSION NUMBER: [id number] DATE: 1/10/2024 16:00 EXAMINATION: CT Abdomen Pelvis WO PROVIDED INDICATION: Kidney  Stone: flank pain COMPARISON: CT abdomen and pelvis 01/06/2024 TECHNIQUE: Axial computed tomographic images of the abdomen and pelvis without intravenous contrast. Oral contrast was not administered. Reformatted images include axial, sagittal, and coronal. FINDINGS: Assessment of the solid organs of vasculature is limited without intravenous contrast. Liver: No focal liver lesion. Hepatic density is diffusely decreased. Biliary: The gallbladder and bile ducts are normal. Pancreas: The pancreas is homogeneous. No main duct dilatation. Spleen: The spleen is normal in size. Adrenal glands: No adrenal mass. Kidneys and ureters: Left-sided nephroureteral stent is appropriately positioned. No hydronephrosis. Stones at the lower pole left kidney measuring up to 9 mm likely represent fragments of the prior left UPJ stone displaced by stent insertion. No calculi in the left ureter or urinary bladder. Urinary bladder: Decompressed by a Tyson catheter. Reproductive: Intrauterine device and bilateral tubal occlusion clips are in place. Uterus and adnexa are otherwise unremarkable. Gastrointestinal: The stomach, small bowel, large bowel, and mesentery are normal. The appendix is normal. Lymphatic: No lymphadenopathy. Vessels: No significant vascular pathology. Peritoneum: No fluid collection, free intraperitoneal fluid, or free intraperitoneal air. Lower chest: Partially imaged lung bases are clear. Heart size is normal. No pericardial or pleural effusion. Body wall: No hernia or mass. Bones: No acute or aggressive osseous lesion. IMPRESSION: 1.  No obstructive urolithiasis or hydronephrosis. 2.  Stones at the lower pole left kidney likely represent stone fragments displaced by stent insertion. 3.  Hepatic steatosis. Dictated by: Surjit Car M.D. Signed by Surjit Car M.D. on 1/10/2024 16:31 ##### Final ##### Dictated by:    SURJIT CAR MD-RAD Dictated DT/TM: 01/10/2024 4:31 pm Interpreted and electronically  signed by:  KERRY CAR MD-RAD Signed DT/TM:  01/10/2024 4:31 pm     Medications given in the ED:  Medications   sodium chloride 0.9 % flush 10 mL (has no administration in time range)   oxyCODONE-acetaminophen (PERCOCET) 5-325 MG per tablet 1 tablet (1 tablet Oral Given 1/11/24 0541)       Orders placed during this visit:  Orders Placed This Encounter   Procedures    Comprehensive Metabolic Panel    Lipase    hCG, Serum, Qualitative    Urinalysis With Microscopic If Indicated (No Culture) - Urine, Clean Catch    CBC Auto Differential    Urinalysis, Microscopic Only - Urine, Clean Catch    Obtain Medical Records (Specify)    Insert Peripheral IV    CBC & Differential       Medical Decision Making:  ED Course as of 01/11/24 1444   Thu Jan 11, 2024   0531 First look: Patient is a 38-year-old female presents emergency department today with flank pain and hematuria.  Patient was admitted at [facility] but left AMA because she felt like she was not  getting appropriate care.  She says she was admitted for worsening infection.  She currently has ureteral stent in place.   [CC]   0622 WBC(!): 17.83 [KA]   0622 Hemoglobin: 13.8 [KA]   0711 On medical chart review, patient was stented for left ureteral stone earlier in the week.  She presented to [facility] for pain control was admitted to the hospital with concerns for UTI as well.  On review of documentation, it appears the patient became rather angry at the admitting team for not prescribing her gabapentin.  This led to a verbal altercation where the patient used profanity directed to the nurse practitioner and ended up leaving the hospital. [TD]   0949 Glucose(!): 160 [KA]   0949 Creatinine(!): 0.55 [KA]   0957 New evaluation remarkable for persistent leukocytosis, hematuria. [KA]   1002 I discussed the patient with SVEN Olson for urology.  We discussed the patient's history presentation labs recent workup and she will evaluate the  patient at the bedside. [KA]   1017 I discussed the patient again with Stephanie Kurtz.  She was evaluated the patient.  She recommends a dose of Toradol here, plans to prescribe the patient Pyridium and oxybutynin and a different antibiotic.  Patient okay for discharge after Toradol [KA]   1103 I reassessed the patient, she is feeling much improved.  She is requesting lunch, will provide with a box lunch.  Urology ordered a dose of Ditropan Pyridium and Bactrim prior to discharge.  Patient if she gets that she can be discharged to continue her outpatient follow-up with her new medications.  She is agreeable with this plan. [KA]      ED Course User Index  [CC] Erika Almanza PA-C  [KA] Lelia Woodard PA-C  [TD] Jimmy Mishra II, MD         Diagnosis  Final diagnoses:   Renal colic on left side   Pain due to ureteral stent, initial encounter              Jimmy Mishra II, MD  01/11/24 4990

## 2024-01-11 NOTE — ED PROVIDER NOTES
EMERGENCY DEPARTMENT ENCOUNTER  Room Number:  07/07  PCP: Bc Mancilla DO  Independent Historians: Patient      HPI:  Chief Complaint: had concerns including Flank Pain (Left flank) and Abdominal Pain.     A complete HPI/ROS/PMH/PSH/SH/FH are unobtainable due to: None    Chronic or social conditions impacting patient care (Social Determinants of Health): None      Context: Sandra Way is a 38 y.o. female with a medical history of IDALMIS, depression, anxiety, neuropathy, BMI of 87 who presents to the ED c/o acute hematuria and left-sided flank and abdominal pain.  She was recently diagnosed with a kidney stone and had a stent placed by Dr. Duarte of [facility] urology on 1/8/2023.  She states since then she has developed significant hematuria and has constant pain in her flank and bladder that worsened yesterday.  She denies fever and vomiting.  She went to urinals with hospital yesterday and it was felt that she had an infection and she was admitted.  She was uncomfortable staying at that hospital because she was not provided with the sleep apnea equipment and states that her oxygen was dropping into the 60s to 70s when she fell asleep and it was not appropriately intervened on and she also felt that her home meds were inappropriately discontinued.  Out of frustration she left the hospital AGAINST MEDICAL ADVICE presented here for further evaluation and treatment.  She has continued pain and hematuria.      Review of prior external notes (non-ED) -and- Review of prior external test results outside of this encounter: Patient admitted to [facility] 1/10/2020 for due to worsening abdominal pain status post left ureteral stent placement several days prior.  Her white count of 18,000.  Was diagnosed with pyelonephritis, noted to have a 5 mm left ureteral stone with hydronephrosis.  Was given Rocephin.  Due to nursing notes patient left because gabapentin which was one of her home medications was not  continued upon her admission.    Prescription drug monitoring program review:         PAST MEDICAL HISTORY  Active Ambulatory Problems     Diagnosis Date Noted    Alternating constipation and diarrhea 07/18/2016    Asthma 04/24/2019    Bilateral anterior knee pain 04/29/2016    Bipolar disease, chronic 07/07/2016    Cardiomegaly 03/10/2021    DDD (degenerative disc disease), lumbar 08/21/2014    De Quervain's tenosynovitis, right 08/21/2014    Family history of heart disease 03/10/2021    Fibromyalgia 04/24/2019    Fibromyositis 06/17/2021    MASOOD (generalized anxiety disorder) 04/24/2019    Panic disorder 04/24/2019    Gastric reflux 07/18/2016    Heartburn 07/18/2016    Gastritis 03/29/2021    Generalized abdominal pain 07/18/2016    Irritable bowel syndrome with diarrhea 03/29/2021    Kidney stone 04/24/2019    Lower extremity edema 03/10/2021    Microcytosis 07/18/2016    Migraine 07/08/2016    Morbid obesity with BMI of 70 and over, adult 10/07/2021    Multiple thyroid nodules 06/09/2021    NAFLD (nonalcoholic fatty liver disease) 03/29/2021    IDALMIS on CPAP 10/07/2021    Patellofemoral syndrome of both knees 04/29/2016    Pneumonia due to COVID-19 virus 10/07/2021    PPD positive 04/24/2019    Pure hypercholesterolemia 03/10/2021    Seasonal allergies 04/24/2019    Tobacco abuse 08/21/2014    Type 2 diabetes mellitus, without long-term current use of insulin 03/10/2021     Resolved Ambulatory Problems     Diagnosis Date Noted    No Resolved Ambulatory Problems     Past Medical History:   Diagnosis Date    Allergic     Anemia     Arthritis     Diabetes mellitus     Fibromyalgia, primary     GERD (gastroesophageal reflux disease)     Headache     Hyperlipidemia     Hypertension     Hypothyroid     Infectious viral hepatitis     Irritable bowel syndrome     Low back pain     Osteopenia     Peptic ulceration     Scoliosis     Vitamin D deficiency          PAST SURGICAL HISTORY  Past Surgical History:   Procedure  Laterality Date    CYSTOSCOPY W/ LASER LITHOTRIPSY      TUBAL ABDOMINAL LIGATION      WRIST SURGERY           FAMILY HISTORY  History reviewed. No pertinent family history.      SOCIAL HISTORY  Social History     Socioeconomic History    Marital status: Single   Tobacco Use    Smoking status: Every Day     Packs/day: 1.5     Types: Cigarettes    Smokeless tobacco: Never   Substance and Sexual Activity    Alcohol use: Not Currently     Comment: 7 years sober    Drug use: Yes     Types: Marijuana    Sexual activity: Not Currently         ALLERGIES  Amitriptyline; Latex, natural rubber; Adhesive tape; Doxycycline; Erythromycin; Latex; Morphine; Zithromax [azithromycin]; and Zolpidem      REVIEW OF SYSTEMS  Review of Systems  Included in HPI  All systems reviewed and negative except for those discussed in HPI.      PHYSICAL EXAM    I have reviewed the triage vital signs and nursing notes.    ED Triage Vitals   Temp Heart Rate Resp BP SpO2   01/11/24 0441 01/11/24 0441 01/11/24 0441 01/11/24 0452 01/11/24 0441   98.1 °F (36.7 °C) 94 18 142/94 97 %      Temp src Heart Rate Source Patient Position BP Location FiO2 (%)   01/11/24 0441 01/11/24 0441 01/11/24 0452 01/11/24 0452 --   Tympanic Monitor Sitting Right arm        Physical Exam  GENERAL: alert, conversational, no acute distress  SKIN: Warm, dry  HENT: Normocephalic, atraumatic  EYES: no scleral icterus  CV: regular rhythm, regular rate  RESPIRATORY: normal effort, lungs clear  ABDOMEN: soft, mild diffuse tenderness, nondistended, no guarding or rigidity  MUSCULOSKELETAL: no deformity  NEURO: alert, moves all extremities, follows commands      NIH:                                                             LAB RESULTS  Recent Results (from the past 24 hour(s))   Lipase    Collection Time: 01/11/24  6:10 AM    Specimen: Blood   Result Value Ref Range    Lipase 8 (L) 13 - 60 U/L   hCG, Serum, Qualitative    Collection Time: 01/11/24  6:10 AM    Specimen: Blood    Result Value Ref Range    HCG Qualitative Negative Negative   CBC Auto Differential    Collection Time: 01/11/24  6:10 AM    Specimen: Blood   Result Value Ref Range    WBC 17.83 (H) 3.40 - 10.80 10*3/mm3    RBC 5.06 3.77 - 5.28 10*6/mm3    Hemoglobin 13.8 12.0 - 15.9 g/dL    Hematocrit 41.6 34.0 - 46.6 %    MCV 82.2 79.0 - 97.0 fL    MCH 27.3 26.6 - 33.0 pg    MCHC 33.2 31.5 - 35.7 g/dL    RDW 13.9 12.3 - 15.4 %    RDW-SD 41.0 37.0 - 54.0 fl    MPV 10.4 6.0 - 12.0 fL    Platelets 416 140 - 450 10*3/mm3    Neutrophil % 68.0 42.7 - 76.0 %    Lymphocyte % 22.0 19.6 - 45.3 %    Monocyte % 4.8 (L) 5.0 - 12.0 %    Eosinophil % 3.9 0.3 - 6.2 %    Basophil % 0.4 0.0 - 1.5 %    Immature Grans % 0.9 (H) 0.0 - 0.5 %    Neutrophils, Absolute 12.13 (H) 1.70 - 7.00 10*3/mm3    Lymphocytes, Absolute 3.92 (H) 0.70 - 3.10 10*3/mm3    Monocytes, Absolute 0.85 0.10 - 0.90 10*3/mm3    Eosinophils, Absolute 0.70 (H) 0.00 - 0.40 10*3/mm3    Basophils, Absolute 0.07 0.00 - 0.20 10*3/mm3    Immature Grans, Absolute 0.16 (H) 0.00 - 0.05 10*3/mm3    nRBC 0.0 0.0 - 0.2 /100 WBC   Urinalysis With Microscopic If Indicated (No Culture) - Urine, Clean Catch    Collection Time: 01/11/24  6:33 AM    Specimen: Urine, Clean Catch   Result Value Ref Range    Color, UA Yellow Yellow, Straw    Appearance, UA Cloudy (A) Clear    pH, UA 5.5 5.0 - 8.0    Specific Gravity, UA 1.022 1.005 - 1.030    Glucose, UA Negative Negative    Ketones, UA Negative Negative    Bilirubin, UA Negative Negative    Blood, UA Large (3+) (A) Negative    Protein,  mg/dL (2+) (A) Negative    Leuk Esterase, UA Small (1+) (A) Negative    Nitrite, UA Negative Negative    Urobilinogen, UA 0.2 E.U./dL 0.2 - 1.0 E.U./dL   Urinalysis, Microscopic Only - Urine, Clean Catch    Collection Time: 01/11/24  6:33 AM    Specimen: Urine, Clean Catch   Result Value Ref Range    RBC, UA 21-50 (A) None Seen, 0-2 /HPF    WBC, UA 0-2 None Seen, 0-2 /HPF    Bacteria, UA 1+ (A) None Seen /HPF     Squamous Epithelial Cells, UA 0-2 None Seen, 0-2 /HPF    Hyaline Casts, UA None Seen None Seen /LPF    Methodology Manual Light Microscopy    Comprehensive Metabolic Panel    Collection Time: 01/11/24  9:16 AM    Specimen: Blood   Result Value Ref Range    Glucose 160 (H) 65 - 99 mg/dL    BUN 16 6 - 20 mg/dL    Creatinine 0.55 (L) 0.57 - 1.00 mg/dL    Sodium 138 136 - 145 mmol/L    Potassium 4.1 3.5 - 5.2 mmol/L    Chloride 101 98 - 107 mmol/L    CO2 25.4 22.0 - 29.0 mmol/L    Calcium 8.5 (L) 8.6 - 10.5 mg/dL    Total Protein 6.6 6.0 - 8.5 g/dL    Albumin 3.7 3.5 - 5.2 g/dL    ALT (SGPT) 15 1 - 33 U/L    AST (SGOT) 11 1 - 32 U/L    Alkaline Phosphatase 80 39 - 117 U/L    Total Bilirubin 0.3 0.0 - 1.2 mg/dL    Globulin 2.9 gm/dL    A/G Ratio 1.3 g/dL    BUN/Creatinine Ratio 29.1 (H) 7.0 - 25.0    Anion Gap 11.6 5.0 - 15.0 mmol/L    eGFR 122.0 >60.0 mL/min/1.73         RADIOLOGY  CT Abdomen Pelvis Without Contrast    Result Date: 1/10/2024  ACCESSION NUMBER: [id number] DATE: 1/10/2024 16:00 EXAMINATION: CT Abdomen Pelvis WO PROVIDED INDICATION: Kidney Stone: flank pain COMPARISON: CT abdomen and pelvis 01/06/2024 TECHNIQUE: Axial computed tomographic images of the abdomen and pelvis without intravenous contrast. Oral contrast was not administered. Reformatted images include axial, sagittal, and coronal. FINDINGS: Assessment of the solid organs of vasculature is limited without intravenous contrast. Liver: No focal liver lesion. Hepatic density is diffusely decreased. Biliary: The gallbladder and bile ducts are normal. Pancreas: The pancreas is homogeneous. No main duct dilatation. Spleen: The spleen is normal in size. Adrenal glands: No adrenal mass. Kidneys and ureters: Left-sided nephroureteral stent is appropriately positioned. No hydronephrosis. Stones at the lower pole left kidney measuring up to 9 mm likely represent fragments of the prior left UPJ stone displaced by stent insertion. No calculi in the left  ureter or urinary bladder. Urinary bladder: Decompressed by a Tyson catheter. Reproductive: Intrauterine device and bilateral tubal occlusion clips are in place. Uterus and adnexa are otherwise unremarkable. Gastrointestinal: The stomach, small bowel, large bowel, and mesentery are normal. The appendix is normal. Lymphatic: No lymphadenopathy. Vessels: No significant vascular pathology. Peritoneum: No fluid collection, free intraperitoneal fluid, or free intraperitoneal air. Lower chest: Partially imaged lung bases are clear. Heart size is normal. No pericardial or pleural effusion. Body wall: No hernia or mass. Bones: No acute or aggressive osseous lesion. IMPRESSION: 1.  No obstructive urolithiasis or hydronephrosis. 2.  Stones at the lower pole left kidney likely represent stone fragments displaced by stent insertion. 3.  Hepatic steatosis. Dictated by: Surjit Car M.D. Signed by Surjit Car M.D. on 1/10/2024 16:31 ##### Final ##### Dictated by:    SURJTI CAR MD-RAD Dictated DT/TM: 01/10/2024 4:31 pm Interpreted and electronically signed by:  SURJIT CAR MD-RAD Signed DT/TM:  01/10/2024 4:31 pm       MEDICATIONS GIVEN IN ER  Medications   oxyCODONE-acetaminophen (PERCOCET) 5-325 MG per tablet 1 tablet (1 tablet Oral Given 1/11/24 0541)   HYDROmorphone (DILAUDID) injection 0.5 mg (0.5 mg Intravenous Given 1/11/24 0859)   ketorolac (TORADOL) injection 15 mg (15 mg Intravenous Given 1/11/24 1027)         ORDERS PLACED DURING THIS VISIT:  Orders Placed This Encounter   Procedures    Comprehensive Metabolic Panel    Lipase    hCG, Serum, Qualitative    Urinalysis With Microscopic If Indicated (No Culture) - Urine, Clean Catch    CBC Auto Differential    Urinalysis, Microscopic Only - Urine, Clean Catch    Urology (on-call MD unless specified)    CBC & Differential         OUTPATIENT MEDICATION MANAGEMENT:  No current Epic-ordered facility-administered medications on file.     Current  Outpatient Medications Ordered in Epic   Medication Sig Dispense Refill    albuterol sulfate  (90 Base) MCG/ACT inhaler Inhale 2 puffs Every 4 (Four) Hours As Needed for Wheezing. 18 g 0    atorvastatin (LIPITOR) 40 MG tablet Take 1 tablet by mouth Daily. 30 tablet 0    budesonide-formoterol (SYMBICORT) 160-4.5 MCG/ACT inhaler Inhale 2 puffs 2 (Two) Times a Day. 10.2 g 0    cetirizine (zyrTEC) 10 MG tablet TAKE 1 TABLET BY MOUTH EVERY DAY 30 tablet 0    cloNIDine (CATAPRES) 0.1 MG tablet Take 1 tablet by mouth 2 (Two) Times a Day. 60 tablet 0    cyanocobalamin (VITAMIN B-12) 1000 MCG tablet Take 1 tablet by mouth Daily. 30 tablet 0    dicyclomine (BENTYL) 10 MG capsule Take 1 capsule by mouth 4 (Four) Times a Day. 120 capsule 0    ferrous sulfate 325 (65 FE) MG tablet Take 1 tablet by mouth Daily. 30 tablet 0    fluticasone (FLONASE) 50 MCG/ACT nasal spray USE 1 SPRAY IN EACH NOSTRIL EVERY DAY 16 g 0    gabapentin (NEURONTIN) 800 MG tablet Take 800 mg by mouth 3 (Three) Times a Day.      levonorgestrel (Mirena, 52 MG,) 20 MCG/24HR IUD Mirena Take No date recorded intrauterine device No frequency recorded No route recorded No set duration recorded No set duration amount recorded active 20 mcg/24 hours (5 yrs) 52 mg      levothyroxine (SYNTHROID, LEVOTHROID) 50 MCG tablet Take 1 tablet by mouth Daily. 30 tablet 0    losartan (COZAAR) 100 MG tablet Take 1 tablet by mouth Daily. 30 tablet 0    metFORMIN (GLUCOPHAGE) 1000 MG tablet Take 1 tablet by mouth 2 (Two) Times a Day. 60 tablet 0    ondansetron (ZOFRAN) 4 MG tablet Take 1 tablet by mouth Every 8 (Eight) Hours As Needed for Nausea or Vomiting. 30 tablet 0    phenazopyridine (PYRIDIUM) 100 MG tablet 3 (three) times daily as needed      sucralfate (CARAFATE) 1 GM/10ML suspension Take 10 mL by mouth 4 (Four) Times a Day. 1200 mL 0    tiZANidine (ZANAFLEX) 4 MG tablet Take 1 tablet by mouth At Night As Needed for Muscle Spasms. 30 tablet 0    traMADol HCl  (ULTRAM) 100 MG tablet Take 1 tablet by mouth 4 (Four) Times a Day.           PROCEDURES  Procedures            PROGRESS, DATA ANALYSIS, CONSULTS, AND MEDICAL DECISION MAKING  All labs have been independently interpreted by me.  All radiology studies have been reviewed by me. All EKG's have been independently viewed and interpreted by me.  Discussion below represents my analysis of pertinent findings related to patient's condition, differential diagnosis, treatment plan and final disposition.    Differential diagnosis includes but is not limited to:  - hepatobiliary pathology such as cholecystitis, cholangitis, and symptomatic cholelithiasis  - Pancreatitis  - Dyspepsia  - Small bowel obstruction  - Appendicitis  - Diverticulitis  - UTI including pyelonephritis  - Ureteral stone  - Zoster  - Colitis, including infectious and ischemic  - Atypical ACS      ED Course as of 01/11/24 1513   Thu Jan 11, 2024   2298 First look: Patient is a 38-year-old female presents emergency department today with flank pain and hematuria.  Patient was admitted at [facility] but left AMA because she felt like she was not  getting appropriate care.  She says she was admitted for worsening infection.  She currently has ureteral stent in place.   [CC]   0622 WBC(!): 17.83 [KA]   0622 Hemoglobin: 13.8 [KA]   0711 On medical chart review, patient was stented for left ureteral stone earlier in the week.  She presented to [facility] for pain control was admitted to the hospital with concerns for UTI as well.  On review of documentation, it appears the patient became rather angry at the admitting team for not prescribing her gabapentin.  This led to a verbal altercation where the patient used profanity directed to the nurse practitioner and ended up leaving the hospital. [TD]   0949 Glucose(!): 160 [KA]   0949 Creatinine(!): 0.55 [KA]   0957 New evaluation remarkable for persistent leukocytosis, hematuria. [KA]   1002 I  discussed the patient with SVEN Olson for urology.  We discussed the patient's history presentation labs recent workup and she will evaluate the patient at the bedside. [KA]   1017 I discussed the patient again with Stephanie Kurtz.  She was evaluated the patient.  She recommends a dose of Toradol here, plans to prescribe the patient Pyridium and oxybutynin and a different antibiotic.  Patient okay for discharge after Toradol [KA]   1103 I reassessed the patient, she is feeling much improved.  She is requesting lunch, will provide with a box lunch.  Urology ordered a dose of Ditropan Pyridium and Bactrim prior to discharge.  Patient if she gets that she can be discharged to continue her outpatient follow-up with her new medications.  She is agreeable with this plan. [KA]      ED Course User Index  [CC] Erika Almanza PA-C  [KA] Lelia Woodard PA-C  [TD] Jimmy Mishra II, MD             AS OF 15:13 EST VITALS:    BP - 150/98  HR - 79  TEMP - 98.1 °F (36.7 °C) (Tympanic)  O2 SATS - 94%    COMPLEXITY OF CARE  Admission was considered but after careful review of the patient's presentation, physical examination, diagnostic results, and response to treatment the patient may be safely discharged with outpatient follow-up.      DIAGNOSIS  Final diagnoses:   Renal colic on left side   Pain due to ureteral stent, initial encounter         DISPOSITION  ED Disposition       ED Disposition   Discharge    Condition   Good    Comment   --                Please note that portions of this document were completed with a voice recognition program.    Note Disclaimer: At [facility], we believe that sharing information builds trust and better relationships. You are receiving this note because you recently visited [facility]. It is possible you will see health information before a provider has talked with you about it. This kind of information can be easy to misunderstand. To help you fully understand what it means  for your health, we urge you to discuss this note with your provider.         Lelia Woodard PA-C  01/11/24 1515

## 2024-01-11 NOTE — CONSULTS
FIRST UROLOGY CONSULT      Patient Identification:  NAME:  Sandra Way  Age:  38 y.o.   Sex:  female   :  1985   MRN:  [id number]     Chief complaint: Left flank pain    History of present illness:      Pt is a 38 y.o. female with a history of kidney stone s/p ureteral stent placement on 2024 presented to the ER on 2024 with complaints of left flank pain.  Patient reports 5 mm stone in the left ureter which prompted stent placement.  Patient has noticed hematuria and increased pain since stent placement. Urology consulted for evaluation and treatment of left flank pain. Pt reports severe left flank pain, burning with urination and intermittent bladder spasms.  Patient denies fever, chills, nausea, difficulty voiding or vomiting.  Patient also states that she was sent home on Cipro but was unable to fill it due to interactions with current medication.    In hospital:  -AVSS, good UOP  -WBC -17.83  -Creat -0.55  -UA -small leukocytes, negative nitrites, 3+ blood    -CT -CT results from outside facility.  No obstructive urolithiasis or hydronephrosis. Stones at the lower pole left kidney likely represent stone fragments displaced by stent insertion.  Stent appears to be positioned appropriately within the kidney.      Asked to see    Past medical history:  Past Medical History:   Diagnosis Date    Allergic     Anemia     Arthritis     Asthma     Diabetes mellitus     Fibromyalgia, primary     GERD (gastroesophageal reflux disease)     Headache     Hyperlipidemia     Hypertension     Hypothyroid     Infectious viral hepatitis     Irritable bowel syndrome     Low back pain     Multiple thyroid nodules     Osteopenia     Peptic ulceration     Scoliosis     Vitamin D deficiency        Past surgical history:  Past Surgical History:   Procedure Laterality Date    CYSTOSCOPY W/ LASER LITHOTRIPSY      TUBAL ABDOMINAL LIGATION      WRIST SURGERY         Allergies:  Amitriptyline; Latex, natural  rubber; Adhesive tape; Doxycycline; Erythromycin; Latex; Morphine; Zithromax [azithromycin]; and Zolpidem    Home medications:  (Not in a hospital admission)       Hospital medications:  oxybutynin, 5 mg, Oral, TID  phenazopyridine, 200 mg, Oral, TID With Meals  sulfamethoxazole-trimethoprim, 1 tablet, Oral, Q12H           [COMPLETED] Insert Peripheral IV **AND** sodium chloride    Family history:  History reviewed. No pertinent family history.    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     Physical Exam:    General Appearance:    Alert, cooperative, NAD   Back:     Left flank pain   Lungs:     Respirations unlabored, no wheezing   Abdomen:     Soft, NDNT, no masses, no guarding   :    Pelvic not performed   Neuro/Psych:   Orientation intact, mood/affect pleasant       Results review:   I reviewed the patient's new clinical results.    Data review:  Lab Results (last 24 hours)       Procedure Component Value Units Date/Time    Comprehensive Metabolic Panel [[id number]]  (Abnormal) Collected: 24    Specimen: Blood Updated: 24     Glucose 160 mg/dL      BUN 16 mg/dL      Creatinine 0.55 mg/dL      Sodium 138 mmol/L      Potassium 4.1 mmol/L      Chloride 101 mmol/L      CO2 25.4 mmol/L      Calcium 8.5 mg/dL      Total Protein 6.6 g/dL      Albumin 3.7 g/dL      ALT (SGPT) 15 U/L      AST (SGOT)  11 U/L      Alkaline Phosphatase 80 U/L      Total Bilirubin 0.3 mg/dL      Globulin 2.9 gm/dL      A/G Ratio 1.3 g/dL      BUN/Creatinine Ratio 29.1     Anion Gap 11.6 mmol/L      eGFR 122.0 mL/min/1.73     Narrative:      GFR Normal >60  Chronic Kidney Disease <60  Kidney Failure <15      Urinalysis, Microscopic Only - Urine, Clean Catch [[id number]]  (Abnormal) Collected: 01/11/24 0633    Specimen: Urine, Clean Catch Updated: 01/11/24 0730     RBC, UA 21-50 /HPF      WBC, UA 0-2 /HPF      Bacteria, UA 1+ /HPF      Squamous Epithelial Cells, UA 0-2 /HPF      Hyaline Casts, UA None Seen /LPF      Methodology Manual Light Microscopy    Urinalysis With Microscopic If Indicated (No Culture) - Urine, Clean Catch [[id number]]  (Abnormal) Collected: 01/11/24 0633    Specimen: Urine, Clean Catch Updated: 01/11/24 0703     Color, UA Yellow     Appearance, UA Cloudy     pH, UA 5.5     Specific Gravity, UA 1.022     Glucose, UA Negative     Ketones, UA Negative     Bilirubin, UA Negative     Blood, UA Large (3+)     Protein,  mg/dL (2+)     Leuk Esterase, UA Small (1+)     Nitrite, UA Negative     Urobilinogen, UA 0.2 E.U./dL    hCG, Serum, Qualitative [[id number]]  (Normal) Collected: 01/11/24 0610    Specimen: Blood Updated: 01/11/24 0646     HCG Qualitative Negative    Lipase [[id number]]  (Abnormal) Collected: 01/11/24 0610    Specimen: Blood Updated: 01/11/24 0636     Lipase 8 U/L     CBC & Differential [[id number]]  (Abnormal) Collected: 01/11/24 0610    Specimen: Blood Updated: 01/11/24 0618    Narrative:      The following orders were created for panel order CBC & Differential.  Procedure                               Abnormality         Status                     ---------                               -----------         ------                     CBC Auto Differential[[id number]]        Abnormal            Final result                 Please view results for these tests on the individual orders.    CBC Auto  Differential [[id number]]  (Abnormal) Collected: 01/11/24 0610    Specimen: Blood Updated: 01/11/24 0618     WBC 17.83 10*3/mm3      RBC 5.06 10*6/mm3      Hemoglobin 13.8 g/dL      Hematocrit 41.6 %      MCV 82.2 fL      MCH 27.3 pg      MCHC 33.2 g/dL      RDW 13.9 %      RDW-SD 41.0 fl      MPV 10.4 fL      Platelets 416 10*3/mm3      Neutrophil % 68.0 %      Lymphocyte % 22.0 %      Monocyte % 4.8 %      Eosinophil % 3.9 %      Basophil % 0.4 %      Immature Grans % 0.9 %      Neutrophils, Absolute 12.13 10*3/mm3      Lymphocytes, Absolute 3.92 10*3/mm3      Monocytes, Absolute 0.85 10*3/mm3      Eosinophils, Absolute 0.70 10*3/mm3      Basophils, Absolute 0.07 10*3/mm3      Immature Grans, Absolute 0.16 10*3/mm3      nRBC 0.0 /100 WBC              Imaging:  Imaging Results (Last 24 Hours)       ** No results found for the last 24 hours. **             Assessment     Left ureteral calculus s/p left stent placement   -Stent appropriately positioned  2.   Left renal colic   -Likely related to ureteral stent      Plan:   - No acute urologic surgical intervention recommended  - Plan for pain management related to ureteral stent  - Ordered Pyridium 200 mg TID PRN, Flomax 0.4 mg QD, Bactrim twice daily x 3 days and Oxybutynin 5 mg 3 times daily PRN.  Will send prescription through our office.  -Patient scheduled secondary procedure 1/19/2024    SVEN Sanz  01/11/24  11:27 EST